# Patient Record
Sex: MALE | URBAN - METROPOLITAN AREA
[De-identification: names, ages, dates, MRNs, and addresses within clinical notes are randomized per-mention and may not be internally consistent; named-entity substitution may affect disease eponyms.]

---

## 2023-04-14 ENCOUNTER — TELEPHONE (OUTPATIENT)
Dept: UROLOGY | Facility: CLINIC | Age: 83
End: 2023-04-14

## 2023-04-14 NOTE — TELEPHONE ENCOUNTER
Spoke with amelia she stated that pt has some kind of urinary device (she was not completely sure) she said its a pump that's in his scrotum with a  wire that goes to the bladder. Advised that we do not, nor have not, dealt with any kind of device like that. Informed Dr. Porter really only removes a device he never places them or replaces them. Informed we have only moved 2 non functioning stimulators ever. She asked if we would still see him to evaluate and refer out. Declined appt due to not something Dr. Porter would see. She verbalized understanding, Lpn

## 2023-04-14 NOTE — TELEPHONE ENCOUNTER
----- Message from Chayito Angulo sent at 4/14/2023  2:26 PM CDT -----  Hali cardenas/[facility] calling wants to know if doctor does procedure on malfunctioning urological devices she can be reached at [phone]    Thanks,